# Patient Record
Sex: FEMALE | Race: WHITE | Employment: FULL TIME | URBAN - METROPOLITAN AREA
[De-identification: names, ages, dates, MRNs, and addresses within clinical notes are randomized per-mention and may not be internally consistent; named-entity substitution may affect disease eponyms.]

---

## 2018-01-05 ENCOUNTER — OFFICE VISIT (OUTPATIENT)
Dept: ORTHOPEDIC SURGERY | Age: 53
End: 2018-01-05

## 2018-01-05 VITALS
HEART RATE: 73 BPM | HEIGHT: 60 IN | WEIGHT: 228 LBS | SYSTOLIC BLOOD PRESSURE: 131 MMHG | BODY MASS INDEX: 44.76 KG/M2 | DIASTOLIC BLOOD PRESSURE: 87 MMHG

## 2018-01-05 DIAGNOSIS — M25.551 RIGHT HIP PAIN: Primary | ICD-10-CM

## 2018-01-05 DIAGNOSIS — M70.61 TROCHANTERIC BURSITIS OF RIGHT HIP: ICD-10-CM

## 2018-01-05 DIAGNOSIS — M25.851 FEMOROACETABULAR IMPINGEMENT OF RIGHT HIP: ICD-10-CM

## 2018-01-05 DIAGNOSIS — M16.11 PRIMARY OSTEOARTHRITIS OF RIGHT HIP: ICD-10-CM

## 2018-01-05 PROCEDURE — 20611 DRAIN/INJ JOINT/BURSA W/US: CPT | Performed by: ORTHOPAEDIC SURGERY

## 2018-01-05 PROCEDURE — 99204 OFFICE O/P NEW MOD 45 MIN: CPT | Performed by: ORTHOPAEDIC SURGERY

## 2018-01-05 PROCEDURE — 73502 X-RAY EXAM HIP UNI 2-3 VIEWS: CPT | Performed by: ORTHOPAEDIC SURGERY

## 2018-01-05 RX ORDER — GABAPENTIN 300 MG/1
CAPSULE ORAL
Refills: 0 | COMMUNITY
Start: 2017-12-14

## 2018-01-05 RX ORDER — LORATADINE AND PSEUDOEPHEDRINE 10; 240 MG/1; MG/1
TABLET, EXTENDED RELEASE ORAL
COMMUNITY

## 2018-01-05 RX ORDER — FERROUS SULFATE 325(65) MG
325 TABLET ORAL
COMMUNITY

## 2018-01-05 RX ORDER — PAROXETINE 10 MG/1
10 TABLET, FILM COATED ORAL
COMMUNITY

## 2018-01-05 RX ORDER — MOMETASONE FUROATE 50 UG/1
2 SPRAY, METERED NASAL
COMMUNITY
Start: 2014-06-03 | End: 2018-04-02

## 2018-01-05 RX ORDER — LANOLIN ALCOHOL/MO/W.PET/CERES
1 CREAM (GRAM) TOPICAL
COMMUNITY
Start: 2010-12-17

## 2018-01-05 RX ORDER — ZINC GLUCONATE 50 MG
TABLET ORAL
COMMUNITY

## 2018-01-05 NOTE — LETTER
Hip Rehabilitation Referral    Patient Name: Darryn Williamson      YOB: 1965    Diagnosis: Right Osteoarthritis/ Trochanteric Bursitis / Hip pain     Precautions: N/A    Evaluate and Treat          Stretching and soft tissue mobs:  Strengthening:   IT Band      Core stabilization   Adductors      Glute eccentric progressing to concentric   Hip Flexors      Pelvic and trunk strengthening   Gluteals      Abductors      Iliopsoas complex     Flexors   Rectus femoris      Progressive resistive exercises   TFL         Sartorius                     Activities:        Kinematic Retraining        Activity modification      Patient education to avoid continued irritation with ADLs   Normalization of gait    Modalities:     Return to Sport:   Ultrasound      Plyometrics   Iontophoresis     Rhythmic stabilization   Moist heat      Core strengthening    Massage      Sports specific program:       Cryotherapy       Electrical stimulation      Paraffin   Whirlpool   TENS   Per therapist discretion   Home exercise program (copy to patient). Perform exercises for:  30    minutes   2- 3      times/day   Supervised physical therapy  Frequency:   1x week   2x week   3x week   Other:   Duration:  2 weeks    4 weeks   6 weeks   Other:     Additional Instructions:       Hip pain   Focus on core stabilization, glute & eccentric hip flexor strengthening progressing to concentric. Soft tissue mobs focus on hip adductors, rectus femoris, iliopsoas, TFL, & gluteals. Kinematic re-training and activity modification as indicated. Trochanteric Bursitis/Glutes Medius Tendinitis/tendinopathy:   Soft tissue mobs to Glutes,  Adductors,  ITB, primary and secondary hip flexors. Core stabilization, pelvic and trunk control exercise, Glute strengthening. Activity modification and Pt education to avoid continued irritation with ADLs. Normalize of gait. AVOID LONG LEVER SIDE or STRAIGHT LEG RAISE.

## 2018-01-05 NOTE — PROGRESS NOTES
TAKE 1-3 CAPS BY MOUTH AT BEDTIME  0    loratadine-pseudoephedrine (CLARITIN-D 24HR)  MG per extended release tablet Take by mouth      mometasone (NASONEX) 50 MCG/ACT nasal spray 2 sprays by Nasal route      PARoxetine (PAXIL) 10 MG tablet Take 10 mg by mouth      zinc gluconate 50 MG tablet Take by mouth      vitamin B-12 (CYANOCOBALAMIN) 1000 MCG tablet Take 1 tablet by mouth      CALCIUM-VITAMIN A-VITAMIN D PO Take 1 each by mouth      ferrous sulfate 325 (65 Fe) MG tablet Take 325 mg by mouth       Social History     Social History    Marital status:      Spouse name: N/A    Number of children: N/A    Years of education: N/A     Occupational History    Not on file. Social History Main Topics    Smoking status: Never Smoker    Smokeless tobacco: Never Used    Alcohol use Not on file    Drug use: Unknown    Sexual activity: Not on file     Other Topics Concern    Not on file     Social History Narrative    No narrative on file     No family history on file. Review of Systems:    Neena Carvalho reported review of systems has been reviewed and has been scanned into her medical record for today's visit. The scanned image can be found in media images folder. She was instructed to contact her primary care physician regarding ROS positives if not already being addressed during today's visit. Objective:   Physical Exam  Vital Signs:  /87   Pulse 73   Ht 5' (1.524 m)   Wt 228 lb (103.4 kg)   BMI 44.53 kg/m²     Constitution:  Generally, Pau Eason is [x] alert, [x] appears stated age, and [x] in no distress.   Her general body habitus is [] Cachectic  [] Thin  [x] Normal  [] Obese  [] Morbidly Obese    Head: [x] Normocephalic  Eyes: [x] Extra-occular muscles intact  Left Ear: [x] External Ear normal   Right Ear: [x] External Ear normal   Nose: [x] Normal  Mouth: [x] Oral mucosa moist  [x] No perioral lesions    Pulmonary: [x] Respirations unlabored and regular  Skin: [x] Warm [x] Well perfused     Psychiatric:   [x] Good judgement and insight  [x] Oriented to [x] person, [x] place, and [x] time  [x] Mood appropriate for circumstances    Gait:   Gait is [] Normal  [x] Impaired   Assistive Device: [] None  [] Knee Brace  [] Cane  [] Crutches   [] Meldon Neth   [] Wheelchair  [] Other     ORTHOPAEDIC LS SPINE EXAM   Inspection:  [x] Skin intact without abrasion, lacerations, surgical scars, pigment changes, dimpling, hairy patches or rashes  [x] Normal back alignment  [] Scoliosis [] Kyphosis  [] Dimpling  [] Hyper/hypopigmentation  [] Hairy Patches  [] Scar / Surgical incision    Palpation:   Nontender    Provocative Tests:  Negative Straight leg raise test    RIGHT HIP ORTHOPAEDIC  EXAM:   Inspection:  [x] Skin intact without abrasion, lacerations or rashes  [x] Leg lengths equal  [] Ecchymosis:  [x] none  [] mild  [] moderate  [] severe   [] Atrophy:  [x] none  [] mild  [] moderate  [] severe      Range of Motion:  [x] No flexion contracture         [] Deferred: acute injury/post-surgery/pain  [] Flexion contracture    Forward flexion: 90  Supine Internal rotation: 10 positive pain  Supine External rotation: 45      Palpation:   Positive Tenderness over greater trochanter    Provocative Tests:  [] Negative  Positive Tests:  [] Log Roll   [x] Nafisa Test: ITB Tightness    [x] FADIR Anterior impingement: Positive   [] Posterior Impingement    [] Shuck test for insufficient suction seal   [] Dial test for capsular insufficiency:    [] Resisted adduction for athletic pubalgia   [] Resisted curl up for athletic pubalgia     Motor Function:  [x] No gross motor weakness of hip [x] No gross motor weakness of knee  [x] No gross motor weakness of ankle    [x] No gross motor weakness of great toe      Neurologic:   [x] Sensation to light touch intact  [x] Coordination / proprioception intact  Motor function intact L2-S1    Circulation:  [x] The limb is warm and well perfused.   [x] symptoms. Patient did not come in today for an injection a separate evaluation was required. Greater than 25 minutes spent in counseling and coordinating care. PROCEDURE NOTE: Right ULTRASOUND GUIDED INTRA-ARTICULAR HIP INJECTION  1/5/2018 at 12:32 PM   Procedure: Injection  Verbal consent was obtained. Risks and benefits were explained. Questions were encouraged and answered. Timeout Verification Completed including:    Correct patient: Zaria Loera was identified    Correct procedure    Correct site & side    Correct equipment and supplies    Staff member: Geno Murphy MD    The patient was given the option of performing today's injection using ultrasound guidance. We discussed the pros and cons of using the ultrasound for guidance. The patient chose to proceed, and today's injection was performed under sterile conditions using and [employer] ultrasound unit with a variable frequency (6.0-15.0  Mhz) linear transducer for localization and needle placement. The image was saved for the medical record. The injection site was prepped with Chlora-Prep using aseptic technique and an intra-articular hip injection was performed. Ropivicaine 0.5% 5 ml with Depomedrol 2 ml (40 mg/ml = 80 mg total) was injected. A sterile adhesive dressing was applied. Post procedure: The patient tolerated the treatment well. After the injection, Zaria Loera noted markedly significant improvement in her hip symptoms as well improved range of motion (particularly with high flexion) and internal rotation. Instructions to patient:  Appropriate post injections instructions were given to the patient. PROCEDURE NOTE: Right HIP GREATER TROCHANTER BURSA INJECTION  Procedure: Injection  Verbal consent was obtained. Risks and benefits were explained. Questions were encouraged and answered.       Timeout Verification Completed including:    Correct patient: Zaria Loera was identified    Correct procedure    Correct site & side    Correct equipment and supplies    Staff member: Bob Mckeon MD      The injection site was prepped with Chlora-Prep using aseptic technique and a greater trochanteric bursa hip injection was performed in the most tender portion of her lateral hip. Lidocaine 1% 3 ml, Marcaine 0.25% 3mL with Depomedrol 1 ml (40 mg/ml = 40 mg total) was injected. A sterile adhesive dressing was applied. Post procedure: The patient tolerated the treatment well. Instructions to patient:  Appropriate post injections instructions were given to the patient. Return to Clinic/Follow - Up:  6-8 weeks PRJOSÉ MIGUEL Waldrop Keerthi was instructed to call the office if her symptoms worsen or if new symptoms appear prior to the next scheduled visit. She is specifically instructed to contact the office between now & her scheduled appointment if she has concerns related to her condition or if she needs assistance in scheduling the above tests. She is welcome to call for an appointment sooner if she has any additional concerns or questions. Patient Education Materials Provided:  [x] Dr Anuja Edwards: New patient folder,  Anatomic Drawings and treatment algorithms    There are no Patient Instructions on file for this visit. Orders Placed This Encounter   Procedures    XR HIP RIGHT (2-3 VIEWS)     20691     Order Specific Question:   Reason for exam:     Answer:   Pain, ROOM 4    US Guided Needle Placement    [facility]     Referral Priority:   Routine     Referral Type:   Eval and Treat     Referral Reason:   Specialty Services Required     Requested Specialty:   Physical Therapy     Number of Visits Requested:   1    WY ARTHROCENTESIS ASPIR&/INJ MAJOR JT/BURSA W/O US    WY METHYLPREDNISOLONE 40 MG INJ       Refills/New Prescriptions:  No orders of the defined types were placed in this encounter.     Today's prescription medications will be e-scribed (when appropriate) to the Patient's Preferred Pharmacy:   [address] [facility], [address] [facility] -  [phone] - F [phone]  [address] [facility] [address]  Phone: [phone] Fax: [phone]         Kye Palmer MD  Orthopaedic Surgeon, Sports Medicine  Director, Hip Arthroscopy and [address] [place] [address]    Contact Information:  ([facility] 50 [phone])  [facility] main number: use after hours [phone])    This dictation was performed with a verbal recognition program (DRAGON) and it was checked for errors. It is possible that there are still dictated errors within this office note. If so, please bring any errors to my attention for an addendum. All efforts were made to ensure that this office note is accurate.

## 2018-01-05 NOTE — PROGRESS NOTES
1/5/18 10:11 AM         NDC: 6953-2458-36    Lot Number: [id number]    Comments: right hip x2        1/5/18 10:11 AM         NDC: 31051-852-25    Lot Number: [id number]    Comments: right hip x2

## 2018-02-26 ENCOUNTER — OFFICE VISIT (OUTPATIENT)
Dept: ORTHOPEDIC SURGERY | Age: 53
End: 2018-02-26

## 2018-02-26 VITALS
DIASTOLIC BLOOD PRESSURE: 97 MMHG | BODY MASS INDEX: 44.75 KG/M2 | HEART RATE: 81 BPM | HEIGHT: 60 IN | SYSTOLIC BLOOD PRESSURE: 149 MMHG | WEIGHT: 227.96 LBS

## 2018-02-26 DIAGNOSIS — M54.5 LOW BACK PAIN, UNSPECIFIED BACK PAIN LATERALITY, UNSPECIFIED CHRONICITY, WITH SCIATICA PRESENCE UNSPECIFIED: ICD-10-CM

## 2018-02-26 DIAGNOSIS — M70.61 TROCHANTERIC BURSITIS OF RIGHT HIP: ICD-10-CM

## 2018-02-26 DIAGNOSIS — M16.11 PRIMARY OSTEOARTHRITIS OF RIGHT HIP: Primary | ICD-10-CM

## 2018-02-26 DIAGNOSIS — M25.851 FEMOROACETABULAR IMPINGEMENT OF RIGHT HIP: ICD-10-CM

## 2018-02-26 PROCEDURE — 99213 OFFICE O/P EST LOW 20 MIN: CPT | Performed by: ORTHOPAEDIC SURGERY

## 2018-02-26 NOTE — PROGRESS NOTES
defined types were placed in this encounter. Refills/New Prescriptions:  No orders of the defined types were placed in this encounter. Today's prescription medications will be e-scribed (when appropriate) to the Patient's Preferred Pharmacy:   [address] [facility], [address] [facility] - P [phone] - F [phone]  [address] [facility] [address]  Phone: [phone] Fax: [phone]       3:00 PM      Chris Gautam PA-C  Physician Assistant, Orthopaedic Surgery and Sports Medicine    During this examination, Chris Gautam PA-C, functioned as a scribe for Dr. Lexii Tiwari. This dictation was performed with a verbal recognition program (DRAGON) and it was checked for errors. It is possible that there are still dictated errors within this office note. If so, please bring any errors to my attention for an addendum. All efforts were made to ensure that this office note is accurate. Attending Attestation Note:    I, Dr. Lexii Tiwari MD, personally performed the services described in this documentation as scribed by Chris Gautam PA-C   in my presence, and it is both accurate and complete. Briefly, patient does appear to be improved following her intra-articular and trochanteric bursal injections.   She now has some SI joint irritation and further this I am going to refer her to one of my colleagues      Lexii Tiwari MD  Orthopaedic Surgeon, Sports Medicine  Director, Hip Arthroscopy and [address] [facility] [address] () - [phone]

## 2018-03-01 ENCOUNTER — OFFICE VISIT (OUTPATIENT)
Dept: ORTHOPEDIC SURGERY | Age: 53
End: 2018-03-01

## 2018-03-01 VITALS
HEIGHT: 60 IN | WEIGHT: 226 LBS | BODY MASS INDEX: 44.37 KG/M2 | DIASTOLIC BLOOD PRESSURE: 78 MMHG | SYSTOLIC BLOOD PRESSURE: 124 MMHG

## 2018-03-01 DIAGNOSIS — M54.50 PAIN OF LUMBAR SPINE: ICD-10-CM

## 2018-03-01 DIAGNOSIS — M54.16 LUMBAR RADICULOPATHY: ICD-10-CM

## 2018-03-01 DIAGNOSIS — M51.36 DDD (DEGENERATIVE DISC DISEASE), LUMBAR: ICD-10-CM

## 2018-03-01 DIAGNOSIS — M43.16 SPONDYLOLISTHESIS OF LUMBAR REGION: Primary | ICD-10-CM

## 2018-03-01 PROCEDURE — 99243 OFF/OP CNSLTJ NEW/EST LOW 30: CPT | Performed by: PHYSICAL MEDICINE & REHABILITATION

## 2018-03-01 NOTE — PROGRESS NOTES
New Patient: SPINE    Referring Provider:  Keysha Rodriguez MD    CHIEF COMPLAINT:    Chief Complaint   Patient presents with    Lower Back Pain     pain for several months, no injury, pain flared up after doing physical therapy for the right hip, worsens when sitting or laying down, mild relief when using ice       HISTORY OF PRESENT ILLNESS:      · The patient is being sent at the request of Keysha Rodriguez MD in consultation as a new spine patient for low back pain and right leg pain. The patient is a 47 y.o. female whom reports She's had low back pain for the past few months. She cannot identify any injury or any inciting event. She underwent a right knee replacement in 2016. She states that she then developed right hip pain. She underwent 2 injections by Dr. Suzette Brito which gave her some relief. She continues though to have this pain and then was told this is probably coming from her back. She is not had any imaging of the lumbar spine. She has tried physical therapy/HEP for at least the last 6 weeks within the past 3 months. Pain Assessment  Location of Pain: Back  Location Modifiers: Posterior, Right  Severity of Pain: 8  Quality of Pain: Sharp  Duration of Pain: Persistent  Frequency of Pain: Constant  Aggravating Factors:  (Laying down  / Sitting)  Limiting Behavior: Yes  Relieving Factors:  Ice  Result of Injury: No  Work-Related Injury: No  Are there other pain locations you wish to document?: No      Associated signs and symptoms:   Neurogenic bowel or bladder symptoms:  no   Perceived weakness:  no   Difficulty walking:  yes    Recent Imaging (within past one year)   Xrays: no   MRI or CT of spine: no    Current/Past Treatment:   · Physical Therapy:  yes  · Chiropractic:  none  · Injection:  none  · Medications:   NSAIDS:  yes   Muscle relaxer:  none   Steriods:  none   Neuropathic medications:  neurontin   Opioids:  none  · Previous surgery:  no  · Previous surgical consult:  no Past Medical History:   Past Medical History:   Diagnosis Date    Seasonal allergies       Past Surgical History:     Past Surgical History:   Procedure Laterality Date    GASTRIC BYPASS SURGERY  11/2001    JOINT REPLACEMENT Right 10/2016    Right TKR     Current Medications:     Current Outpatient Prescriptions:     desloratadine-pseudoephedrine (CLARINEX-D 12 HOUR) 2.5-120 MG per extended release tablet, Take 1 tablet by mouth, Disp: , Rfl:     gabapentin (NEURONTIN) 300 MG capsule, TAKE 1-3 CAPS BY MOUTH AT BEDTIME, Disp: , Rfl: 0    loratadine-pseudoephedrine (CLARITIN-D 24HR)  MG per extended release tablet, Take by mouth, Disp: , Rfl:     mometasone (NASONEX) 50 MCG/ACT nasal spray, 2 sprays by Nasal route, Disp: , Rfl:     PARoxetine (PAXIL) 10 MG tablet, Take 10 mg by mouth, Disp: , Rfl:     zinc gluconate 50 MG tablet, Take by mouth, Disp: , Rfl:     vitamin B-12 (CYANOCOBALAMIN) 1000 MCG tablet, Take 1 tablet by mouth, Disp: , Rfl:     CALCIUM-VITAMIN A-VITAMIN D PO, Take 1 each by mouth, Disp: , Rfl:     ferrous sulfate 325 (65 Fe) MG tablet, Take 325 mg by mouth, Disp: , Rfl:   Allergies:  Penicillins  Social History:    reports that she has never smoked. She has never used smokeless tobacco.  Family History:   History reviewed. No pertinent family history. REVIEW OF SYSTEMS: Full ROS noted & scanned          PHYSICAL EXAM:    Vitals: Blood pressure 124/78, height 5' 0.05\" (1.525 m), weight 226 lb (102.5 kg). GENERAL EXAM:  · General Apparence: Patient is adequately groomed with no evidence of malnutrition. · Psychiatric: Orientation: The patient is oriented to time, place and person. The patient's mood and affect are appropriate   · Vascular: Examination reveals no swelling and palpation reveals no tenderness in upper or lower extremities. Good capillary refill.    · The lymphatic examination of the neck, axillae and groin reveals all areas to be without enlargement or

## 2018-03-12 ENCOUNTER — OFFICE VISIT (OUTPATIENT)
Dept: ORTHOPEDIC SURGERY | Age: 53
End: 2018-03-12

## 2018-03-12 VITALS
DIASTOLIC BLOOD PRESSURE: 87 MMHG | HEART RATE: 82 BPM | BODY MASS INDEX: 44.36 KG/M2 | WEIGHT: 225.97 LBS | HEIGHT: 60 IN | SYSTOLIC BLOOD PRESSURE: 150 MMHG

## 2018-03-12 DIAGNOSIS — M54.16 LUMBAR RADICULOPATHY: ICD-10-CM

## 2018-03-12 DIAGNOSIS — M51.36 DDD (DEGENERATIVE DISC DISEASE), LUMBAR: ICD-10-CM

## 2018-03-12 DIAGNOSIS — M43.16 SPONDYLOLISTHESIS OF LUMBAR REGION: Primary | ICD-10-CM

## 2018-03-12 PROCEDURE — 99213 OFFICE O/P EST LOW 20 MIN: CPT | Performed by: PHYSICAL MEDICINE & REHABILITATION

## 2018-03-12 RX ORDER — MELOXICAM 15 MG/1
15 TABLET ORAL DAILY
Qty: 30 TABLET | Refills: 0 | Status: SHIPPED | OUTPATIENT
Start: 2018-03-12 | End: 2018-04-02

## 2018-03-12 RX ORDER — CYCLOBENZAPRINE HCL 10 MG
10 TABLET ORAL NIGHTLY
Qty: 30 TABLET | Refills: 0 | Status: SHIPPED | OUTPATIENT
Start: 2018-03-12 | End: 2018-04-02

## 2018-03-15 ENCOUNTER — TELEPHONE (OUTPATIENT)
Dept: ORTHOPEDIC SURGERY | Age: 53
End: 2018-03-15

## 2018-04-02 ENCOUNTER — HOSPITAL ENCOUNTER (OUTPATIENT)
Dept: PAIN MANAGEMENT | Age: 53
Discharge: HOME | End: 2018-03-16
Attending: PHYSICAL MEDICINE & REHABILITATION | Admitting: PHYSICAL MEDICINE & REHABILITATION

## 2018-04-02 ENCOUNTER — HOSPITAL ENCOUNTER (OUTPATIENT)
Dept: PAIN MANAGEMENT | Age: 53
Discharge: HOME | End: 2018-03-30
Attending: PHYSICAL MEDICINE & REHABILITATION | Admitting: PHYSICAL MEDICINE & REHABILITATION

## 2018-04-02 VITALS
TEMPERATURE: 98 F | HEART RATE: 68 BPM | SYSTOLIC BLOOD PRESSURE: 117 MMHG | OXYGEN SATURATION: 100 % | DIASTOLIC BLOOD PRESSURE: 58 MMHG | RESPIRATION RATE: 18 BRPM

## 2018-04-02 RX ORDER — ERGOCALCIFEROL (VITAMIN D2) 1250 MCG
50000 CAPSULE ORAL WEEKLY
COMMUNITY

## 2018-04-02 ASSESSMENT — PAIN - FUNCTIONAL ASSESSMENT
PAIN_FUNCTIONAL_ASSESSMENT: 0-10
PAIN_FUNCTIONAL_ASSESSMENT: 0-10

## 2018-04-02 ASSESSMENT — PAIN DESCRIPTION - DESCRIPTORS: DESCRIPTORS: SHARP

## 2018-04-09 ENCOUNTER — PAT TELEPHONE (OUTPATIENT)
Dept: PREADMISSION TESTING | Age: 53
End: 2018-04-09

## 2018-04-09 DIAGNOSIS — M51.36 DDD (DEGENERATIVE DISC DISEASE), LUMBAR: ICD-10-CM

## 2018-04-09 DIAGNOSIS — M43.16 SPONDYLOLISTHESIS OF LUMBAR REGION: ICD-10-CM

## 2018-04-09 DIAGNOSIS — M54.16 LUMBAR RADICULOPATHY: ICD-10-CM

## 2018-04-09 RX ORDER — MELOXICAM 15 MG/1
15 TABLET ORAL DAILY
Qty: 30 TABLET | Refills: 0 | Status: SHIPPED | OUTPATIENT
Start: 2018-04-09 | End: 2018-04-23

## 2018-04-09 NOTE — PRE-PROCEDURE INSTRUCTIONS
PATIENT REACHED   YES_X___NO____    PREOP INSTUCTIONS       DATE_4/23/18________ TIME_0800________ARRIVAL_0700_______PLACE_[facility]___________  NOTHING TO EAT OR DRINK  6 HOURS PRIOR TO PROCEDURE START TIME  YOU NEED A RESPONSIBLE ADULT AGE 18 OR OLDER TO DRIVE YOU HOME  PLEASE BRING INSURANCE CARD. PICTURE ID AND COMPLETE LIST OF MEDS  WEAR LOOSE COMFORTABLE CLOTHING  FOLLOW ANY INSTRUCTIONS YOUR DRS OFFICE HAS GIVEN YOU,INCLUDING WHAT MEDICATIONS TO TAKE THE AM OF PROCEDURE AND WHEN AND IF YOU NEED TO STOP ANY BLOOD THINNERS.  IF YOU HAVE QUESTIONS REGARDING THIS CALL THE OFFICE  THE GOAL BLOOD SUGAR SUGAR THE AM OF PROCEDURE  OR LESS ABOVE THAT THE PROCEDURE MAY BE CANCELLED  ANY QUESTIONS CALL YOUR DOCTOR  SPINE INTERVENTION NUMBER [phone]

## 2018-04-11 ENCOUNTER — TELEPHONE (OUTPATIENT)
Dept: ORTHOPEDIC SURGERY | Age: 53
End: 2018-04-11

## 2018-04-23 ENCOUNTER — HOSPITAL ENCOUNTER (OUTPATIENT)
Dept: PAIN MANAGEMENT | Age: 53
Discharge: HOME | End: 2018-04-23
Attending: PHYSICAL MEDICINE & REHABILITATION | Admitting: PHYSICAL MEDICINE & REHABILITATION

## 2018-04-23 ENCOUNTER — HOSPITAL ENCOUNTER (OUTPATIENT)
Dept: PAIN MANAGEMENT | Age: 53
Discharge: HOME | End: 2018-04-02
Attending: PHYSICAL MEDICINE & REHABILITATION | Admitting: PHYSICAL MEDICINE & REHABILITATION

## 2018-04-23 VITALS
RESPIRATION RATE: 16 BRPM | TEMPERATURE: 98 F | SYSTOLIC BLOOD PRESSURE: 135 MMHG | OXYGEN SATURATION: 100 % | DIASTOLIC BLOOD PRESSURE: 78 MMHG | HEART RATE: 80 BPM

## 2018-04-23 ASSESSMENT — PAIN - FUNCTIONAL ASSESSMENT
PAIN_FUNCTIONAL_ASSESSMENT: 0-10
PAIN_FUNCTIONAL_ASSESSMENT: 0-10

## 2018-05-07 ENCOUNTER — OFFICE VISIT (OUTPATIENT)
Dept: ORTHOPEDIC SURGERY | Age: 53
End: 2018-05-07

## 2018-05-07 VITALS
BODY MASS INDEX: 45.33 KG/M2 | SYSTOLIC BLOOD PRESSURE: 131 MMHG | HEIGHT: 61 IN | WEIGHT: 240.08 LBS | HEART RATE: 79 BPM | DIASTOLIC BLOOD PRESSURE: 83 MMHG

## 2018-05-07 DIAGNOSIS — M54.16 LUMBAR RADICULOPATHY: ICD-10-CM

## 2018-05-07 DIAGNOSIS — M51.26 HNP (HERNIATED NUCLEUS PULPOSUS), LUMBAR: ICD-10-CM

## 2018-05-07 DIAGNOSIS — M43.16 SPONDYLOLISTHESIS OF LUMBAR REGION: Primary | ICD-10-CM

## 2018-05-07 PROCEDURE — 99213 OFFICE O/P EST LOW 20 MIN: CPT | Performed by: PHYSICAL MEDICINE & REHABILITATION

## 2018-05-07 RX ORDER — MELOXICAM 15 MG/1
15 TABLET ORAL DAILY
Qty: 30 TABLET | Refills: 0 | Status: SHIPPED | OUTPATIENT
Start: 2018-05-07